# Patient Record
(demographics unavailable — no encounter records)

---

## 2024-11-07 NOTE — HISTORY OF PRESENT ILLNESS
[Patient reported mammogram was normal] : Patient reported mammogram was normal [Patient reported breast sonogram was normal] : Patient reported breast sonogram was normal [Patient reported PAP Smear was normal] : Patient reported PAP Smear was normal [Patient reported bone density results were abnormal] : Patient reported bone density results were abnormal [postmenopausal] : postmenopausal [N] : Patient is not sexually active [Y] : Positive pregnancy history [TextBox_4] : 66 yo  for well woman exam.     No hx abnormal pap smears, abnormal bleeding, fibroids, cysts. No hx STIs.   D&Cs for perimenopausal bleeding - all normal, +fibroids; h/o DCIS s/p lumpectomy on right and radiation No urinary complaints.  Past medical, surgical, social and family hx reviewed. Daughter had breast CA, BRCA negative. [Mammogramdate] : 12/23 [BreastSonogramDate] : 12/23 [PapSmeardate] : 11/23 [BoneDensityDate] : 11/23 [TextBox_37] : osteopenia [PGHxTotal] : 2 [[facility] : 2 [facility] : 2

## 2024-11-07 NOTE — DISCUSSION/SUMMARY
[FreeTextEntry1] : Unremarkable CBE and SBE reviewed Mammogram /Breast US ordered Pelvic exam unremarkable Pap/HPV collected I reviewed practices to support bone health including Vitamin D3 (2000 IU) and calcium rich foods with limitation on calcium supplementation by tabular form to 600 MG by mouth daily, a minimum of 30 minutes of weight bearing exercise at least 3 x weekly and limited daily sun exposure, 10-15 minutes.  Healthy diet, exercise and sleep hygiene discussed The importance of a screening colonoscopy was discussed.
[FreeTextEntry1] : Unremarkable CBE and SBE reviewed Mammogram /Breast US ordered Pelvic exam unremarkable Pap/HPV collected I reviewed practices to support bone health including Vitamin D3 (2000 IU) and calcium rich foods with limitation on calcium supplementation by tabular form to 600 MG by mouth daily, a minimum of 30 minutes of weight bearing exercise at least 3 x weekly and limited daily sun exposure, 10-15 minutes.  Healthy diet, exercise and sleep hygiene discussed The importance of a screening colonoscopy was discussed.
yes

## 2024-12-02 NOTE — HISTORY OF PRESENT ILLNESS
[FreeTextEntry1] : Janis is a 65yoF with PMHx: HTN, HLD, GERD, MI s/p PCI to pLAD.   She presents in clinic today after having elevated BP reading in 1 arm during OBGYN visit. She reports while checking BPs at home "one arm is consistently elevated in the 160s." She brings in home BP cuff for calibration today:  Manual BP Left Arm 154/100 vs. Cuff BP Left Arm 186/101 Manual BP Right Arm 148/90 vs. Cuff BP Right Arm 162/95 She endorses HAs with elevated BPs. No focal neuro deficits. She was made aware that her home BP cuff is not reading accurately, she should subtract ~20 points from SBP when measuring BPs at home for accurate reads based on above info.  Home BP log provided for my review which confirms R arm BP readings > L arm. Hypertensive BPs. See scanned document.   She also reports LOZA with stair climbing. No LE edema. Clear lungs. Euvolemic. This is new. She is active with working, walking on a daily basis. She denies any chest pain, pressure or discomfort. Denies PND, or orthopnea. Denies palpitations, dizziness, lightheadedness, near syncope or syncope.

## 2024-12-02 NOTE — CARDIOLOGY SUMMARY
[de-identified] : 7/11/22: Sinus rhythm, within normal limits 1/23/2023: Sinus bradycardia otherwise within normal limits 1/26/2024: SR 7/22/2024: . natalia   [de-identified] : 2024: LVEF 60-65%, no WMA, dilated Asc Ao 3.8cm. PASP 23mmHg. Prominent IVC inflow. Cannot rule out Prominent IVC inflow v.s. small PFO/ASD. [de-identified] : 2/19: PCI to LAD

## 2024-12-02 NOTE — DISCUSSION/SUMMARY
[FreeTextEntry1] : AXEL GUZMAN is a 65 year old F who presents today Dec 02, 2024 with the above history and the following active issues:   CAD: s/p PCI to pLAD 2019. On ASA, Toprol, Atorvastatin. New complaints of LOZA.  Recommend Exercise Nuke on Meds to evaluate for evidence of myocardial ischemia and stent patency.  F/u OV to review.   HTN: Increase Lisinopril to 10mg daily. F/u labs. F/u BP check in 2 weeks. Continue Toprol.  High risk/red flag sx of HTN which would warrant emergent evaluation in ED via 911 were discussed at length. She understands.   HLD: LDL was 86 in June 2024. Normal LFTs. Continue Atorvastatin.   GERD: On Pantoprazole. Ongoing f/u with PCP.   F/u after above testing, 2-week BP check. Limitations of non-invasive testing reviewed. Red flag symptoms which would warrant sooner emergent evaluation reviewed with the patient. All questions and concerns were addressed and answered.   Sincerely,  Maribel Judd [facility] Patient's history, testing, medications and any relative changes in plan of care reviewed with supervising MD: Dr. Hal Rogers

## 2024-12-16 NOTE — HISTORY OF PRESENT ILLNESS
[FreeTextEntry1] : Janis is a 65yoF with PMHx: HTN, HLD, GERD, MI s/p PCI to pLAD.   Recently elevated blood pressures at home. She brought home BP cuff for calibration during last OV. Manual BP Left Arm 154/100 vs. Cuff BP Left Arm 186/101 Manual BP Right Arm 148/90 vs. Cuff BP Right Arm 162/95  She endorses HAs with elevated BPs. No focal neuro deficits. She was made aware that her home BP cuff is not reading accurately, she should subtract ~20 points from SBP when measuring BPs at home for accurate reads based on above info.  Home BP log provided for my review which confirms R arm BP readings > L arm. Hypertensive BPs.   She also reports recent onset LOZA with stair climbing. No LE edema. Clear lungs. Euvolemic.  She is active with working, walking on a daily basis. She denies any chest pain, pressure or discomfort. Denies PND, or orthopnea. Denies palpitations, dizziness, lightheadedness, near syncope or syncope.

## 2024-12-16 NOTE — CARDIOLOGY SUMMARY
[de-identified] : 7/11/22: Sinus rhythm, within normal limits 1/23/2023: Sinus bradycardia otherwise within normal limits 1/26/2024: SR 7/22/2024: . natalia   [de-identified] : 2024: LVEF 60-65%, no WMA, dilated Asc Ao 3.8cm. PASP 23mmHg. Prominent IVC inflow. Cannot rule out Prominent IVC inflow v.s. small PFO/ASD. [de-identified] : 2/19: PCI to LAD

## 2024-12-16 NOTE — CARDIOLOGY SUMMARY
[de-identified] : 7/11/22: Sinus rhythm, within normal limits 1/23/2023: Sinus bradycardia otherwise within normal limits 1/26/2024: SR 7/22/2024: . natalia   [de-identified] : 2024: LVEF 60-65%, no WMA, dilated Asc Ao 3.8cm. PASP 23mmHg. Prominent IVC inflow. Cannot rule out Prominent IVC inflow v.s. small PFO/ASD. [de-identified] : 2/19: PCI to LAD

## 2024-12-16 NOTE — DISCUSSION/SUMMARY
[FreeTextEntry1] : AXEL GUZMAN is a 65 year old F with following active issues:   CAD: s/p PCI to pLAD 2019. On ASA, Toprol, Atorvastatin. New complaints of LOZA.  Nuclear stress test: Exercised 6 minutes on Gerald protocol.  Max systolic blood pressure 154.  SPECT scan showed apical attenuation.  No ischemia.  HTN: Increasee Lisinopril to 10mg daily during last OV. F/u labs.  Blood pressure today borderline.  HLD: LDL was 86 in June 2024. Normal LFTs. Continue Atorvastatin.   GERD: On Pantoprazole. Ongoing f/u with PCP.   Thank you for this referral and allowing me to participate in the care of this patient.  If I can be of any further help or  if you have any questions, please do not hesitate to contact me   Sincerely,  Hal Rogers MD, FACC, LISE

## 2025-01-13 NOTE — HISTORY OF PRESENT ILLNESS
[FreeTextEntry1] : 65F w/ PMH of HTN, HLD, GERD, MI s/p PCI to pLAD presents for routine follow up.  Since her last office visit her diarrhea has resolved completely.  She has started cardiac rehab and has been participating in it without issues.  She has lost some weight and continues to remain physically active.  She returned to work without issue.   1/13/2025: Underwent nuke since our last visit - attenuation without ischemia.  She is otherwise feeling well.

## 2025-01-13 NOTE — REASON FOR VISIT
[Hyperlipidemia] : hyperlipidemia [Hypertension] : hypertension [Coronary Artery Disease] : coronary artery disease [FreeTextEntry3] : Dr. Charlton

## 2025-01-13 NOTE — DISCUSSION/SUMMARY
[Patient] : the patient [With Me] : with me [___ Month(s)] : in [unfilled] month(s) [FreeTextEntry1] : 65F w/ PMH as above presenting for routine follow up post-MI.  She is feeling well and asymptomatic from a cardiac perspective.  1. CAD - Continue ASA 81 mg PO daily.  2. HLD - continue lipitor 10 mg PO QHS 3. HTN - well controlled, continue lopressor 25 mg PO BID and lisinopril 10 mg PO daily  RTC 6 months [EKG obtained to assist in diagnosis and management of assessed problem(s)] : EKG obtained to assist in diagnosis and management of assessed problem(s)

## 2025-01-13 NOTE — CARDIOLOGY SUMMARY
[de-identified] : 7/16/21: Sinus bradycardia, WNL 1/18/2022: Sinus rhythm, within normal limits. 7/11/22: Sinus rhythm, within normal limits 1/23/2023: Sinus bradycardia otherwise within normal limits 1/26/2024: SR 7/22/2024: SR. wnl 1/13/2025: NSR [de-identified] : 2/21: EF 60-65%, mild MR, mild TR, PFO/ASD possible? 2024: my review, preserved EF, no WMA [de-identified] : 2/19: PCI to LAD

## 2025-07-28 NOTE — HISTORY OF PRESENT ILLNESS
[FreeTextEntry1] : 66F w/ PMH of HTN, HLD, GERD, MI s/p PCI to pLAD presents for routine follow up.  Since her last office visit her diarrhea has resolved completely.  She has started cardiac rehab and has been participating in it without issues.  She has lost some weight and continues to remain physically active.  She returned to work without issue.   7/28/2025: Undergoing some significant life stressors.  Denies chest pains, SOB and LE edema.  Compliant with meds.

## 2025-07-28 NOTE — DISCUSSION/SUMMARY
[Patient] : the patient [With Me] : with me [___ Month(s)] : in [unfilled] month(s) [FreeTextEntry1] : 66F w/ PMH as above presenting for routine follow up post-MI.  She is feeling well and asymptomatic from a cardiac perspective.  1. CAD - Continue ASA 81 mg PO daily.  2. HLD - continue lipitor 10 mg PO QHS 3. HTN - well controlled, continue lopressor 25 mg PO BID and lisinopril 10 mg PO daily Obesity - start Zepbound 2.5 mg SC weekly.   RTC 6 months [EKG obtained to assist in diagnosis and management of assessed problem(s)] : EKG obtained to assist in diagnosis and management of assessed problem(s)

## 2025-07-28 NOTE — REASON FOR VISIT
[Hyperlipidemia] : hyperlipidemia [Hypertension] : hypertension [Coronary Artery Disease] : coronary artery disease [FreeTextEntry3] : Dr. Monroe

## 2025-07-28 NOTE — CARDIOLOGY SUMMARY
[de-identified] : 7/16/21: Sinus bradycardia, WNL 1/18/2022: Sinus rhythm, within normal limits. 7/11/22: Sinus rhythm, within normal limits 1/23/2023: Sinus bradycardia otherwise within normal limits 1/26/2024: SR 7/22/2024: SR. wnl 1/13/2025: NSR 7/28/2025: NSR [de-identified] : 2/21: EF 60-65%, mild MR, mild TR, PFO/ASD possible? 2024: my review, preserved EF, no WMA [de-identified] : 2/19: PCI to LAD